# Patient Record
Sex: FEMALE | Race: WHITE | NOT HISPANIC OR LATINO | URBAN - METROPOLITAN AREA
[De-identification: names, ages, dates, MRNs, and addresses within clinical notes are randomized per-mention and may not be internally consistent; named-entity substitution may affect disease eponyms.]

---

## 2020-07-01 ENCOUNTER — TELEPHONE ENCOUNTER (OUTPATIENT)
Dept: URBAN - METROPOLITAN AREA CLINIC 128 | Facility: CLINIC | Age: 80
End: 2020-07-01

## 2020-07-02 ENCOUNTER — TELEPHONE ENCOUNTER (OUTPATIENT)
Dept: URBAN - METROPOLITAN AREA CLINIC 19 | Facility: CLINIC | Age: 80
End: 2020-07-02

## 2020-07-20 ENCOUNTER — OFFICE VISIT (OUTPATIENT)
Dept: URBAN - METROPOLITAN AREA CLINIC 128 | Facility: CLINIC | Age: 80
End: 2020-07-20
Payer: MEDICARE

## 2020-07-20 DIAGNOSIS — R10.9 ABDOMINAL PAIN, UNSPECIFIED ABDOMINAL LOCATION: ICD-10-CM

## 2020-07-20 PROCEDURE — G8420 CALC BMI NORM PARAMETERS: HCPCS | Performed by: INTERNAL MEDICINE

## 2020-07-20 PROCEDURE — G8427 DOCREV CUR MEDS BY ELIG CLIN: HCPCS | Performed by: INTERNAL MEDICINE

## 2020-07-20 PROCEDURE — 1036F TOBACCO NON-USER: CPT | Performed by: INTERNAL MEDICINE

## 2020-07-20 PROCEDURE — 99214 OFFICE O/P EST MOD 30 MIN: CPT | Performed by: INTERNAL MEDICINE

## 2020-07-20 RX ORDER — LEVOTHYROXINE SODIUM 75 UG/1
TABLET ORAL
Qty: 0 | Refills: 0 | Status: ACTIVE | COMMUNITY
Start: 1900-01-01

## 2020-07-20 RX ORDER — DICYCLOMINE HYDROCHLORIDE 20 MG/1
TAKE 1 TABLET (20 MG) BY ORAL ROUTE 3 TIMES PER DAY FOR 90 DAYS TABLET ORAL
Qty: 270 | Refills: 1 | Status: DISCONTINUED | COMMUNITY
Start: 2020-04-09 | End: 2020-10-06

## 2020-07-20 RX ORDER — ROSUVASTATIN CALCIUM 5 MG
TABLET ORAL
Qty: 0 | Refills: 0 | Status: ACTIVE | COMMUNITY
Start: 1900-01-01

## 2020-07-20 RX ORDER — METOPROLOL SUCCINATE 25 MG/1
TABLET, EXTENDED RELEASE ORAL
Qty: 0 | Refills: 0 | Status: ACTIVE | COMMUNITY
Start: 1900-01-01

## 2020-07-20 RX ORDER — HYOSCYAMINE SULFATE 0.12 MG/1
1 TABLET AS NEEDED TABLET ORAL
Status: ACTIVE | COMMUNITY

## 2020-07-20 RX ORDER — LOSARTAN POTASSIUM 50 MG/1
TABLET ORAL
Qty: 0 | Refills: 0 | Status: ACTIVE | COMMUNITY
Start: 1900-01-01

## 2020-07-20 NOTE — HPI-TODAY'S VISIT:
Mrs. Del Valle is a 80 year old female who was last seen in GI clinic on 4/30/2020 for abdominal pain.  Today she reports having a lot of pain 7/18/2020 and 7/19/2020. She reports taking ibuprofen 600mg and reports it helped with pain severity.   She is currently on ibgard as well as align.   Prior history is summarized below: -Her last EGD and colonoscopy was on 10/31/2017 which showed hyperplastic polyps and diverticulosis.  -Her CT A/P with IV contrast was on 9/27/2018 which was negative.  -She had a CT with IV and PO contrast on 2/12/2019 that showed no acute abnormality of the small or large bowel.  -Viberzi was stopped as her stools "turned to bricks."  -The CT scan mentioned enhancing foci in the right lobe of the liver measuring 1.3 x 1.2 cm for which MRI was recommended.  -On 4/23/2019 she had a MRI liver that showed "subcentimeter lesion with signal characteristics of hepatic hemangioma within segment 6 of the right hepatic lobe and subcentimeter benign appearing cysts with segment 7 of the right hepatic lobe." -On 12/6/2019 Mrs. Del Valle reported eating gluten- and lactose-free diet and it seems to be helping her with her IBS. -ON 4/9/2020 we prescribed dicyclomine and she reports remembering her in the past it made her "head feel funny."  -She reports starting align on 4/25/2020.

## 2020-07-23 ENCOUNTER — TELEPHONE ENCOUNTER (OUTPATIENT)
Dept: URBAN - METROPOLITAN AREA CLINIC 128 | Facility: CLINIC | Age: 80
End: 2020-07-23

## 2020-08-17 ENCOUNTER — OFFICE VISIT (OUTPATIENT)
Dept: URBAN - METROPOLITAN AREA CLINIC 128 | Facility: CLINIC | Age: 80
End: 2020-08-17
Payer: MEDICARE

## 2020-08-17 DIAGNOSIS — R10.84 GENERALIZED ABDOMINAL PAIN: ICD-10-CM

## 2020-08-17 PROCEDURE — G8427 DOCREV CUR MEDS BY ELIG CLIN: HCPCS | Performed by: INTERNAL MEDICINE

## 2020-08-17 PROCEDURE — G8420 CALC BMI NORM PARAMETERS: HCPCS | Performed by: INTERNAL MEDICINE

## 2020-08-17 PROCEDURE — 99214 OFFICE O/P EST MOD 30 MIN: CPT | Performed by: INTERNAL MEDICINE

## 2020-08-17 PROCEDURE — 1036F TOBACCO NON-USER: CPT | Performed by: INTERNAL MEDICINE

## 2020-08-17 RX ORDER — ROSUVASTATIN CALCIUM 5 MG
TABLET ORAL
Qty: 0 | Refills: 0 | Status: ACTIVE | COMMUNITY
Start: 1900-01-01

## 2020-08-17 RX ORDER — HYOSCYAMINE SULFATE 0.12 MG/1
1 TABLET AS NEEDED TABLET ORAL
Status: ACTIVE | COMMUNITY

## 2020-08-17 RX ORDER — LEVOTHYROXINE SODIUM 75 UG/1
TABLET ORAL
Qty: 0 | Refills: 0 | Status: ACTIVE | COMMUNITY
Start: 1900-01-01

## 2020-08-17 RX ORDER — METOPROLOL SUCCINATE 25 MG/1
TABLET, EXTENDED RELEASE ORAL
Qty: 0 | Refills: 0 | Status: ACTIVE | COMMUNITY
Start: 1900-01-01

## 2020-08-17 RX ORDER — NORTRIPTYLINE HYDROCHLORIDE 10 MG/1
1 CAPSULE CAPSULE ORAL ONCE A DAY
Qty: 30 CAPSULE | Refills: 2 | OUTPATIENT
Start: 2020-08-17

## 2020-08-17 RX ORDER — LOSARTAN POTASSIUM 50 MG/1
TABLET ORAL
Qty: 0 | Refills: 0 | Status: ACTIVE | COMMUNITY
Start: 1900-01-01

## 2020-08-17 NOTE — HPI-TODAY'S VISIT:
Mrs. Del Valle is a 80 year old female who was last seen in GI clinic on 7/20/2020 for abdominal pain.      We obtained copy of EKG from 2/27/2020 which shows QTc of 401.   She reports continuing to have abdominal pain.   At last clinic visit we referred to [facility] but she will like to defer for now.   She is currently on ibgard as well as align.   Prior history is summarized below: -Her last EGD and colonoscopy was on 10/31/2017 which showed hyperplastic polyps and diverticulosis.  -Her CT A/P with IV contrast was on 9/27/2018 which was negative.  -She had a CT with IV and PO contrast on 2/12/2019 that showed no acute abnormality of the small or large bowel.  -Viberzi was stopped as her stools "turned to bricks."  -The CT scan mentioned enhancing foci in the right lobe of the liver measuring 1.3 x 1.2 cm for which MRI was recommended.  -On 4/23/2019 she had a MRI liver that showed "subcentimeter lesion with signal characteristics of hepatic hemangioma within segment 6 of the right hepatic lobe and subcentimeter benign appearing cysts with segment 7 of the right hepatic lobe." -On 12/6/2019 Mrs. Del Valle reported eating gluten- and lactose-free diet and it seems to be helping her with her IBS. -On 4/9/2020 we prescribed dicyclomine and she reports remembering her in the past it made her "head feel funny."  -She reports starting align on 4/25/2020. -On 7/20/2020 she reported having a lot of pain 7/18/2020 and 7/19/2020. She reports taking ibuprofen 600mg and reports it helped with pain severity.

## 2020-09-14 ENCOUNTER — OFFICE VISIT (OUTPATIENT)
Dept: URBAN - METROPOLITAN AREA CLINIC 128 | Facility: CLINIC | Age: 80
End: 2020-09-14
Payer: MEDICARE

## 2020-09-14 DIAGNOSIS — R10.84 GENERALIZED ABDOMINAL PAIN: ICD-10-CM

## 2020-09-14 DIAGNOSIS — K58.0 IRRITABLE BOWEL SYNDROME WITH DIARRHEA: ICD-10-CM

## 2020-09-14 PROBLEM — 197125005: Status: ACTIVE | Noted: 2020-09-14

## 2020-09-14 PROCEDURE — G8427 DOCREV CUR MEDS BY ELIG CLIN: HCPCS | Performed by: INTERNAL MEDICINE

## 2020-09-14 PROCEDURE — 1036F TOBACCO NON-USER: CPT | Performed by: INTERNAL MEDICINE

## 2020-09-14 PROCEDURE — G8420 CALC BMI NORM PARAMETERS: HCPCS | Performed by: INTERNAL MEDICINE

## 2020-09-14 PROCEDURE — 99214 OFFICE O/P EST MOD 30 MIN: CPT | Performed by: INTERNAL MEDICINE

## 2020-09-14 RX ORDER — ROSUVASTATIN CALCIUM 5 MG
TABLET ORAL
Qty: 0 | Refills: 0 | Status: ACTIVE | COMMUNITY
Start: 1900-01-01

## 2020-09-14 RX ORDER — NORTRIPTYLINE HYDROCHLORIDE 10 MG/1
1 CAPSULE CAPSULE ORAL ONCE A DAY
Qty: 30 CAPSULE | Refills: 2 | Status: ACTIVE | COMMUNITY
Start: 2020-08-17

## 2020-09-14 RX ORDER — NORTRIPTYLINE HYDROCHLORIDE 10 MG/1
2 CAPSULE CAPSULE ORAL ONCE A DAY
Qty: 180 CAPSULE | Refills: 3 | OUTPATIENT
Start: 2020-09-14

## 2020-09-14 RX ORDER — LEVOTHYROXINE SODIUM 75 UG/1
TABLET ORAL
Qty: 0 | Refills: 0 | Status: ACTIVE | COMMUNITY
Start: 1900-01-01

## 2020-09-14 RX ORDER — LOSARTAN POTASSIUM 50 MG/1
TABLET ORAL
Qty: 0 | Refills: 0 | Status: ACTIVE | COMMUNITY
Start: 1900-01-01

## 2020-09-14 RX ORDER — METOPROLOL SUCCINATE 25 MG/1
TABLET, EXTENDED RELEASE ORAL
Qty: 0 | Refills: 0 | Status: ACTIVE | COMMUNITY
Start: 1900-01-01

## 2020-09-14 RX ORDER — HYOSCYAMINE SULFATE 0.12 MG/1
1 TABLET AS NEEDED TABLET ORAL
Status: ACTIVE | COMMUNITY

## 2020-09-14 NOTE — HPI-TODAY'S VISIT:
Mrs. Del Valle is a 80 year old female who was last seen in GI clinic on 8/17/2020 for abdominal pain.          On 8/17/2020 we initiated nortripyline 10mg QHS. Today she reports it is helping with her symptoms and she is having more good days than bad days.  She reports after starting nortripyline her stool frequency went from 2-3 times a day to once a day.   Prior history is summarized below: -Her last EGD and colonoscopy was on 10/31/2017 which showed hyperplastic polyps and diverticulosis.  -Her CT A/P with IV contrast was on 9/27/2018 which was negative.  -She had a CT with IV and PO contrast on 2/12/2019 that showed no acute abnormality of the small or large bowel.  -Viberzi was stopped as her stools "turned to bricks."  -The CT scan mentioned enhancing foci in the right lobe of the liver measuring 1.3 x 1.2 cm for which MRI was recommended.  -On 4/23/2019 she had a MRI liver that showed "subcentimeter lesion with signal characteristics of hepatic hemangioma within segment 6 of the right hepatic lobe and subcentimeter benign appearing cysts with segment 7 of the right hepatic lobe." -On 12/6/2019 Mrs. Del Valle reported eating gluten- and lactose-free diet and it seems to be helping her with her IBS. -On 4/9/2020 we prescribed dicyclomine and she reports remembering her in the past it made her "head feel funny."  -She reports starting align on 4/25/2020. -On 7/20/2020 she reported having a lot of pain 7/18/2020 and 7/19/2020. She reports taking ibuprofen 600mg and reports it helped with pain severity. -EKG from 2/27/2020 which shows QTc of 401.  -On 8/17/2020 she was on ibgard as well as align.

## 2020-12-07 ENCOUNTER — OFFICE VISIT (OUTPATIENT)
Dept: URBAN - METROPOLITAN AREA CLINIC 128 | Facility: CLINIC | Age: 80
End: 2020-12-07
Payer: MEDICARE

## 2020-12-07 DIAGNOSIS — R10.32 LLQ ABDOMINAL PAIN: ICD-10-CM

## 2020-12-07 PROCEDURE — 74177 CT ABD & PELVIS W/CONTRAST: CPT | Performed by: INTERNAL MEDICINE

## 2020-12-07 PROCEDURE — G8484 FLU IMMUNIZE NO ADMIN: HCPCS | Performed by: INTERNAL MEDICINE

## 2020-12-07 PROCEDURE — G8427 DOCREV CUR MEDS BY ELIG CLIN: HCPCS | Performed by: INTERNAL MEDICINE

## 2020-12-07 PROCEDURE — 99214 OFFICE O/P EST MOD 30 MIN: CPT | Performed by: INTERNAL MEDICINE

## 2020-12-07 PROCEDURE — 1036F TOBACCO NON-USER: CPT | Performed by: INTERNAL MEDICINE

## 2020-12-07 RX ORDER — ROSUVASTATIN CALCIUM 5 MG
TABLET ORAL
Qty: 0 | Refills: 0 | Status: ACTIVE | COMMUNITY
Start: 1900-01-01

## 2020-12-07 RX ORDER — NORTRIPTYLINE HYDROCHLORIDE 25 MG/1
1 CAPSULE CAPSULE ORAL ONCE A DAY
Qty: 90 CAPSULE | Refills: 1
Start: 2020-09-14

## 2020-12-07 RX ORDER — NORTRIPTYLINE HYDROCHLORIDE 10 MG/1
2 CAPSULE CAPSULE ORAL ONCE A DAY
Qty: 180 CAPSULE | Refills: 3 | Status: ACTIVE | COMMUNITY
Start: 2020-09-14

## 2020-12-07 RX ORDER — NORTRIPTYLINE HYDROCHLORIDE 10 MG/1
1 CAPSULE CAPSULE ORAL ONCE A DAY
Qty: 30 CAPSULE | Refills: 2 | Status: ACTIVE | COMMUNITY
Start: 2020-08-17

## 2020-12-07 RX ORDER — LOSARTAN POTASSIUM 50 MG/1
TABLET ORAL
Qty: 0 | Refills: 0 | Status: ACTIVE | COMMUNITY
Start: 1900-01-01

## 2020-12-07 RX ORDER — HYOSCYAMINE SULFATE 0.12 MG/1
1 TABLET AS NEEDED TABLET ORAL
Status: ACTIVE | COMMUNITY

## 2020-12-07 RX ORDER — METOPROLOL SUCCINATE 25 MG/1
TABLET, EXTENDED RELEASE ORAL
Qty: 0 | Refills: 0 | Status: ACTIVE | COMMUNITY
Start: 1900-01-01

## 2020-12-07 RX ORDER — HYOSCYAMINE SULFATE 0.12 MG/1
1 TABLET AS NEEDED TABLET ORAL
Qty: 120 TABLET | Refills: 3

## 2020-12-07 RX ORDER — LEVOTHYROXINE SODIUM 75 UG/1
TABLET ORAL
Qty: 0 | Refills: 0 | Status: ACTIVE | COMMUNITY
Start: 1900-01-01

## 2020-12-07 NOTE — HPI-TODAY'S VISIT:
Mrs. Del Valle is a 80 year old female who was last seen in GI clinic on 9/14/2020 for abdominal pain.          She reports she was doing well after her last clinic visit but a couple of weeks ago starting having more LLQ abdominal pain. She reports being under a lot of stress with the death of her spouse of 56 years.   She reports no fevers/chills. She reports her pain was more severe this weekend and was concernced about diveticulitis.   Prior history is summarized below: -Her last EGD and colonoscopy was on 10/31/2017 which showed hyperplastic polyps and diverticulosis.  -Her CT A/P with IV contrast was on 9/27/2018 which was negative.  -She had a CT with IV and PO contrast on 2/12/2019 that showed no acute abnormality of the small or large bowel.  -Viberzi was stopped as her stools "turned to bricks."  -The CT scan mentioned enhancing foci in the right lobe of the liver measuring 1.3 x 1.2 cm for which MRI was recommended.  -On 4/23/2019 she had a MRI liver that showed "subcentimeter lesion with signal characteristics of hepatic hemangioma within segment 6 of the right hepatic lobe and subcentimeter benign appearing cysts with segment 7 of the right hepatic lobe." -On 12/6/2019 Mrs. Del Valle reported eating gluten- and lactose-free diet and it seems to be helping her with her IBS. -On 4/9/2020 we prescribed dicyclomine and she reports remembering her in the past it made her "head feel funny."  -She reports starting align on 4/25/2020. -On 7/20/2020 she reported having a lot of pain 7/18/2020 and 7/19/2020. She reports taking ibuprofen 600mg and reports it helped with pain severity. -EKG from 2/27/2020 which shows QTc of 401.  -On 8/17/2020 she was on ibgard as well as align. -On 8/17/2020 we initiated nortripyline 10mg QHS. On 9/14/2020 she reported it is helping with her symptoms and she is having more good days than bad days. She reports after starting nortripyline her stool frequency went from 2-3 times a day to once a day.

## 2020-12-11 ENCOUNTER — LAB OUTSIDE AN ENCOUNTER (OUTPATIENT)
Dept: URBAN - METROPOLITAN AREA CLINIC 19 | Facility: CLINIC | Age: 80
End: 2020-12-11

## 2020-12-11 LAB
CREATININE POC: 1
PERFORMING LAB: (no result)

## 2021-01-20 ENCOUNTER — TELEPHONE ENCOUNTER (OUTPATIENT)
Dept: URBAN - METROPOLITAN AREA CLINIC 19 | Facility: CLINIC | Age: 81
End: 2021-01-20

## 2021-01-20 ENCOUNTER — TELEPHONE ENCOUNTER (OUTPATIENT)
Dept: URBAN - METROPOLITAN AREA CLINIC 96 | Facility: CLINIC | Age: 81
End: 2021-01-20

## 2021-01-21 ENCOUNTER — OFFICE VISIT (OUTPATIENT)
Dept: URBAN - METROPOLITAN AREA CLINIC 19 | Facility: CLINIC | Age: 81
End: 2021-01-21
Payer: MEDICARE

## 2021-01-21 ENCOUNTER — WEB ENCOUNTER (OUTPATIENT)
Dept: URBAN - METROPOLITAN AREA CLINIC 19 | Facility: CLINIC | Age: 81
End: 2021-01-21

## 2021-01-21 DIAGNOSIS — R63.4 UNINTENTIONAL WEIGHT LOSS: ICD-10-CM

## 2021-01-21 DIAGNOSIS — R10.32 LLQ ABDOMINAL PAIN: ICD-10-CM

## 2021-01-21 PROCEDURE — 1036F TOBACCO NON-USER: CPT | Performed by: INTERNAL MEDICINE

## 2021-01-21 PROCEDURE — G8427 DOCREV CUR MEDS BY ELIG CLIN: HCPCS | Performed by: INTERNAL MEDICINE

## 2021-01-21 PROCEDURE — 99214 OFFICE O/P EST MOD 30 MIN: CPT | Performed by: INTERNAL MEDICINE

## 2021-01-21 PROCEDURE — G8482 FLU IMMUNIZE ORDER/ADMIN: HCPCS | Performed by: INTERNAL MEDICINE

## 2021-01-21 RX ORDER — NORTRIPTYLINE HYDROCHLORIDE 25 MG/1
1 CAPSULE CAPSULE ORAL ONCE A DAY
Qty: 90 CAPSULE | Refills: 1 | Status: ACTIVE | COMMUNITY
Start: 2020-09-14

## 2021-01-21 RX ORDER — ROSUVASTATIN CALCIUM 5 MG
TABLET ORAL
Qty: 0 | Refills: 0 | Status: ACTIVE | COMMUNITY
Start: 1900-01-01

## 2021-01-21 RX ORDER — LOSARTAN POTASSIUM 50 MG/1
TABLET ORAL
Qty: 0 | Refills: 0 | Status: ACTIVE | COMMUNITY
Start: 1900-01-01

## 2021-01-21 RX ORDER — NORTRIPTYLINE HYDROCHLORIDE 10 MG/1
1 CAPSULE CAPSULE ORAL ONCE A DAY
Qty: 30 CAPSULE | Refills: 2 | Status: ACTIVE | COMMUNITY
Start: 2020-08-17

## 2021-01-21 RX ORDER — METOPROLOL SUCCINATE 25 MG/1
TABLET, EXTENDED RELEASE ORAL
Qty: 0 | Refills: 0 | Status: ACTIVE | COMMUNITY
Start: 1900-01-01

## 2021-01-21 RX ORDER — LEVOTHYROXINE SODIUM 75 UG/1
TABLET ORAL
Qty: 0 | Refills: 0 | Status: ACTIVE | COMMUNITY
Start: 1900-01-01

## 2021-01-21 RX ORDER — HYOSCYAMINE SULFATE 0.12 MG/1
1 TABLET AS NEEDED TABLET ORAL
Qty: 120 TABLET | Refills: 3 | Status: ACTIVE | COMMUNITY

## 2021-01-21 NOTE — HPI-TODAY'S VISIT:
Mrs. Del Valle is a 80 year old female who was last seen in GI clinic who was last seen in GI clinic on 12/7/2020 for abdominal pain.           At time of last clinic visit we ordered a CT A/P to further evaluate LLQ abdominal pain. The CT A/P showed "no acute abnormality in the abdomen or pelvis. It did show gastroesophageal reflu and fecal loading suspicious for constipation."   At time of last clinic visit we increased nortripyline from 20mg to 25mg. She reports having issues with dry mouth which caused her to need to stop the medication.   She reports being started on zoloft.   She reports having LLQ abdominal pain which she reports is different from her symptoms in 2017. She reports being very concerned with her pain and weight loss and is inquiring regarding performing a colonoscopy.   Prior history is summarized below: -Her EGD and colonoscopy was on 10/31/2017 which showed hyperplastic polyps and diverticulosis.  -Her CT A/P with IV contrast was on 9/27/2018 which was negative.  -She had a CT with IV and PO contrast on 2/12/2019 that showed no acute abnormality of the small or large bowel.  -Viberzi was stopped as her stools "turned to bricks."  -The CT scan mentioned enhancing foci in the right lobe of the liver measuring 1.3 x 1.2 cm for which MRI was recommended.  -On 4/23/2019 she had a MRI liver that showed "subcentimeter lesion with signal characteristics of hepatic hemangioma within segment 6 of the right hepatic lobe and subcentimeter benign appearing cysts with segment 7 of the right hepatic lobe." -On 12/6/2019 Mrs. Del Valle reported eating gluten- and lactose-free diet and it seems to be helping her with her IBS. -On 4/9/2020 we prescribed dicyclomine and she reports remembering her in the past it made her "head feel funny."  -She reports starting align on 4/25/2020. -On 7/20/2020 she reported having a lot of pain 7/18/2020 and 7/19/2020. She reports taking ibuprofen 600mg and reports it helped with pain severity. -EKG from 2/27/2020 which shows QTc of 401.  -On 8/17/2020 she was on ibgard as well as align. -On 8/17/2020 we initiated nortripyline 10mg QHS. On 9/14/2020 she reported it is helping with her symptoms and she is having more good days than bad days. She reports after starting nortripyline her stool frequency went from 2-3 times a day to once a day. -On 12/7/2020 she reported she was doing well after her last clinic visit but a couple of weeks ago starting having more LLQ abdominal pain. She reports being under a lot of stress with the death of her spouse of 56 years.

## 2021-02-15 ENCOUNTER — CLAIMS CREATED FROM THE CLAIM WINDOW (OUTPATIENT)
Dept: URBAN - METROPOLITAN AREA CLINIC 4 | Facility: CLINIC | Age: 81
End: 2021-02-15
Payer: MEDICARE

## 2021-02-15 ENCOUNTER — OFFICE VISIT (OUTPATIENT)
Dept: URBAN - METROPOLITAN AREA SURGERY CENTER 31 | Facility: SURGERY CENTER | Age: 81
End: 2021-02-15
Payer: MEDICARE

## 2021-02-15 ENCOUNTER — OFFICE VISIT (OUTPATIENT)
Dept: URBAN - METROPOLITAN AREA CLINIC 128 | Facility: CLINIC | Age: 81
End: 2021-02-15

## 2021-02-15 DIAGNOSIS — K63.89 OTHER SPECIFIED DISEASES OF INTESTINE: ICD-10-CM

## 2021-02-15 DIAGNOSIS — R10.32 ABDOMINAL CRAMPING IN LEFT LOWER QUADRANT: ICD-10-CM

## 2021-02-15 PROCEDURE — 45380 COLONOSCOPY AND BIOPSY: CPT | Performed by: INTERNAL MEDICINE

## 2021-02-15 PROCEDURE — 88342 IMHCHEM/IMCYTCHM 1ST ANTB: CPT | Performed by: PATHOLOGY

## 2021-02-15 PROCEDURE — 88305 TISSUE EXAM BY PATHOLOGIST: CPT | Performed by: PATHOLOGY

## 2021-02-15 PROCEDURE — G8907 PT DOC NO EVENTS ON DISCHARG: HCPCS | Performed by: INTERNAL MEDICINE

## 2021-02-15 PROCEDURE — 88313 SPECIAL STAINS GROUP 2: CPT | Performed by: PATHOLOGY

## 2021-02-15 RX ORDER — NORTRIPTYLINE HYDROCHLORIDE 25 MG/1
1 CAPSULE CAPSULE ORAL ONCE A DAY
Qty: 90 CAPSULE | Refills: 1 | Status: ACTIVE | COMMUNITY
Start: 2020-09-14

## 2021-02-15 RX ORDER — HYOSCYAMINE SULFATE 0.12 MG/1
1 TABLET AS NEEDED TABLET ORAL
Qty: 120 TABLET | Refills: 3 | Status: ACTIVE | COMMUNITY

## 2021-02-15 RX ORDER — LEVOTHYROXINE SODIUM 75 UG/1
TABLET ORAL
Qty: 0 | Refills: 0 | Status: ACTIVE | COMMUNITY
Start: 1900-01-01

## 2021-02-15 RX ORDER — ROSUVASTATIN CALCIUM 5 MG
TABLET ORAL
Qty: 0 | Refills: 0 | Status: ACTIVE | COMMUNITY
Start: 1900-01-01

## 2021-02-15 RX ORDER — NORTRIPTYLINE HYDROCHLORIDE 10 MG/1
1 CAPSULE CAPSULE ORAL ONCE A DAY
Qty: 30 CAPSULE | Refills: 2 | Status: ACTIVE | COMMUNITY
Start: 2020-08-17

## 2021-02-15 RX ORDER — METOPROLOL SUCCINATE 25 MG/1
TABLET, EXTENDED RELEASE ORAL
Qty: 0 | Refills: 0 | Status: ACTIVE | COMMUNITY
Start: 1900-01-01

## 2021-02-15 RX ORDER — LOSARTAN POTASSIUM 50 MG/1
TABLET ORAL
Qty: 0 | Refills: 0 | Status: ACTIVE | COMMUNITY
Start: 1900-01-01

## 2021-02-22 ENCOUNTER — TELEPHONE ENCOUNTER (OUTPATIENT)
Dept: URBAN - METROPOLITAN AREA CLINIC 19 | Facility: CLINIC | Age: 81
End: 2021-02-22

## 2021-04-12 ENCOUNTER — OFFICE VISIT (OUTPATIENT)
Dept: URBAN - METROPOLITAN AREA CLINIC 128 | Facility: CLINIC | Age: 81
End: 2021-04-12
Payer: MEDICARE

## 2021-04-12 DIAGNOSIS — R10.32 LLQ ABDOMINAL PAIN: ICD-10-CM

## 2021-04-12 PROCEDURE — 99213 OFFICE O/P EST LOW 20 MIN: CPT | Performed by: INTERNAL MEDICINE

## 2021-04-12 RX ORDER — HYOSCYAMINE SULFATE 0.12 MG/1
1 TABLET AS NEEDED TABLET ORAL
Qty: 120 TABLET | Refills: 3 | Status: ACTIVE | COMMUNITY

## 2021-04-12 RX ORDER — SERTRALINE 25 MG/1
1.5 TABLET TABLET, FILM COATED ORAL ONCE A DAY
Status: ACTIVE | COMMUNITY

## 2021-04-12 RX ORDER — LEVOTHYROXINE SODIUM 75 UG/1
TABLET ORAL
Qty: 0 | Refills: 0 | Status: ACTIVE | COMMUNITY
Start: 1900-01-01

## 2021-04-12 RX ORDER — ROSUVASTATIN CALCIUM 5 MG
TABLET ORAL
Qty: 0 | Refills: 0 | Status: ACTIVE | COMMUNITY
Start: 1900-01-01

## 2021-04-12 RX ORDER — LOSARTAN POTASSIUM 50 MG/1
TABLET ORAL
Qty: 0 | Refills: 0 | Status: ACTIVE | COMMUNITY
Start: 1900-01-01

## 2021-04-12 RX ORDER — NORTRIPTYLINE HYDROCHLORIDE 10 MG/1
1 CAPSULE CAPSULE ORAL ONCE A DAY
Qty: 30 CAPSULE | Refills: 2 | Status: DISCONTINUED | COMMUNITY
Start: 2020-08-17

## 2021-04-12 RX ORDER — NORTRIPTYLINE HYDROCHLORIDE 25 MG/1
1 CAPSULE CAPSULE ORAL ONCE A DAY
Qty: 90 CAPSULE | Refills: 1 | Status: DISCONTINUED | COMMUNITY
Start: 2020-09-14

## 2021-04-12 RX ORDER — METOPROLOL SUCCINATE 25 MG/1
TABLET, EXTENDED RELEASE ORAL
Qty: 0 | Refills: 0 | Status: ACTIVE | COMMUNITY
Start: 1900-01-01

## 2021-04-12 NOTE — HPI-TODAY'S VISIT:
Mrs. Del Valle is a 80 year old female who was last seen in GI clinic who was last seen in GI clinic on 1/21/2021 for abdominal pain.                 On 2/15/2021 she had a colonoscopy that showed sigmoid colon diverticulosis and medium sized internal hemorrhoids. Pathology was negative for microscopic colitis.   Today she reports having days with no problems and other days where she has LLQ abdominal pain/cramping. She reports hyoscyamine helps. She reports gaining weight; her weight was on 109.8 lbs on 1/21/2021 and today it was 112.8 lbs.   She reports typically having a BM daily and occassionally twice a day.   She reports currently being on sertraline which is prescribed by her PCP.   Prior history is summarized below: -Her EGD and colonoscopy was on 10/31/2017 which showed hyperplastic polyps and diverticulosis.  -Her CT A/P with IV contrast was on 9/27/2018 which was negative.  -She had a CT with IV and PO contrast on 2/12/2019 that showed no acute abnormality of the small or large bowel.   -Viberzi was stopped as her stools "turned to bricks."  -The CT scan mentioned enhancing foci in the right lobe of the liver measuring 1.3 x 1.2 cm for which MRI was recommended.  -On 4/23/2019 she had a MRI liver that showed "subcentimeter lesion with signal characteristics of hepatic hemangioma within segment 6 of the right hepatic lobe and subcentimeter benign appearing cysts with segment 7 of the right hepatic lobe." -On 12/6/2019 Mrs. Del Valle reported eating gluten- and lactose-free diet and it seems to be helping her with her IBS. She reports low FODMAP diet is helping with symptoms.  -On 4/9/2020 we prescribed dicyclomine and she reports remembering her in the past it made her "head feel funny."  -She reports starting align on 4/25/2020. -On 7/20/2020 she reported having a lot of pain 7/18/2020 and 7/19/2020. She reports taking ibuprofen 600mg and reports it helped with pain severity. -EKG from 2/27/2020 which shows QTc of 401.  -On 8/17/2020 she was on ibgard as well as align. -On 8/17/2020 we initiated nortripyline 10mg QHS. On 9/14/2020 she reported it is helping with her symptoms and she is having more good days than bad days.  -On 12/11/2020 CT A/P showed "no acute abnormality in the abdomen or pelvis. It did show gastroesophageal reflux and fecal loading suspicious for constipation."  -On 12/7/2020 we increased nortripyline from 20mg to 25mg. She reports having issues with dry mouth on nortripyline which caused her to need to stop the medication.

## 2022-03-02 ENCOUNTER — TELEPHONE ENCOUNTER (OUTPATIENT)
Dept: URBAN - METROPOLITAN AREA CLINIC 128 | Facility: CLINIC | Age: 82
End: 2022-03-02

## 2022-03-02 RX ORDER — HYOSCYAMINE SULFATE 0.12 MG/1
1 TABLET AS NEEDED TABLET ORAL
Qty: 90 | Refills: 3

## 2022-03-23 ENCOUNTER — OFFICE VISIT (OUTPATIENT)
Dept: URBAN - METROPOLITAN AREA CLINIC 128 | Facility: CLINIC | Age: 82
End: 2022-03-23
Payer: MEDICARE

## 2022-03-23 DIAGNOSIS — R10.32 LLQ ABDOMINAL PAIN: ICD-10-CM

## 2022-03-23 PROCEDURE — 99213 OFFICE O/P EST LOW 20 MIN: CPT | Performed by: PHYSICIAN ASSISTANT

## 2022-03-23 RX ORDER — METRONIDAZOLE 500 MG/1
1 TABLET TABLET, FILM COATED ORAL THREE TIMES A DAY
Qty: 30 TABLET | Refills: 0 | OUTPATIENT
Start: 2022-03-23 | End: 2022-04-02

## 2022-03-23 RX ORDER — SERTRALINE 25 MG/1
1.5 TABLET TABLET, FILM COATED ORAL ONCE A DAY
Status: ACTIVE | COMMUNITY

## 2022-03-23 RX ORDER — LOSARTAN POTASSIUM 100 MG/1
1 TABLET TABLET ORAL ONCE A DAY
Refills: 0 | Status: ACTIVE | COMMUNITY
Start: 1900-01-01

## 2022-03-23 RX ORDER — LEVOTHYROXINE SODIUM 75 UG/1
TABLET ORAL
Qty: 0 | Refills: 0 | Status: ACTIVE | COMMUNITY
Start: 1900-01-01

## 2022-03-23 RX ORDER — HYOSCYAMINE SULFATE 0.12 MG/1
1 TABLET AS NEEDED TABLET ORAL
Qty: 90 | Refills: 3 | Status: ACTIVE | COMMUNITY

## 2022-03-23 RX ORDER — ROSUVASTATIN CALCIUM 5 MG
TABLET ORAL
Qty: 0 | Refills: 0 | Status: ACTIVE | COMMUNITY
Start: 1900-01-01

## 2022-03-23 RX ORDER — METOPROLOL SUCCINATE 25 MG/1
TABLET, EXTENDED RELEASE ORAL
Qty: 0 | Refills: 0 | Status: ACTIVE | COMMUNITY
Start: 1900-01-01

## 2022-03-23 RX ORDER — CIPROFLOXACIN 500 MG/1
1 TABLET TABLET, FILM COATED ORAL
Qty: 20 TABLET | Refills: 0 | OUTPATIENT
Start: 2022-03-23 | End: 2022-04-02

## 2022-03-23 NOTE — HPI-TODAY'S VISIT:
Mrs. Del Valle is a 80 year old female who was last seen in GI clinic who was last seen in GI clinic on 1/21/2021 for abdominal pain.                 On 2/15/2021 she had a colonoscopy that showed sigmoid colon diverticulosis and medium sized internal hemorrhoids. Pathology was negative for microscopic colitis.   Today she reports having days with no problems and other days where she has LLQ abdominal pain/cramping. She states the hyoscyamine makes her cramps worse. She reports gaining weight.  She reports typically having a formed non-bloody BM daily and occassionally twice a day.   She reports currently being on sertraline which is prescribed by her PCP.   Prior history is summarized below: -Her EGD and colonoscopy was on 10/31/2017 which showed hyperplastic polyps and diverticulosis.  -Her CT A/P with IV contrast was on 9/27/2018 which was negative.  -She had a CT with IV and PO contrast on 2/12/2019 that showed no acute abnormality of the small or large bowel.   -Viberzi was stopped as her stools "turned to bricks."  -The CT scan mentioned enhancing foci in the right lobe of the liver measuring 1.3 x 1.2 cm for which MRI was recommended.  -On 4/23/2019 she had a MRI liver that showed "subcentimeter lesion with signal characteristics of hepatic hemangioma within segment 6 of the right hepatic lobe and subcentimeter benign appearing cysts with segment 7 of the right hepatic lobe." -On 12/6/2019 Mrs. Del Valle reported eating gluten- and lactose-free diet and it seems to be helping her with her IBS. She reports low FODMAP diet is helping with symptoms.  -On 4/9/2020 we prescribed dicyclomine and she reports remembering her in the past it made her "head feel funny."  -On 7/20/2020 she reported having a lot of pain 7/18/2020 and 7/19/2020. She reports taking ibuprofen 600mg and reports it helped with pain severity. -On 8/17/2020 she was on ibgard as well as align. -On 8/17/2020 we initiated nortripyline 10mg QHS. On 9/14/2020 she reported it is helping with her symptoms and she is having more good days than bad days.  -On 12/11/2020 CT A/P showed "no acute abnormality in the abdomen or pelvis. It did show gastroesophageal reflux and fecal loading suspicious for constipation."  -On 12/7/2020 we increased nortripyline from 20mg to 25mg. She reports having issues with dry mouth on nortripyline which caused her to need to stop the medication.  Currently, the patient elicits having LLQ abdominal x several weeks. She has had a history of diverticulitis. She has been told she has IBS. She has 2 Bms a day on average.  Her last colonosocpy was 02/20201. She denies fever, nausea, vomiting, diarrhea. She has been avoiding gluten and dairy in her diet.

## 2022-03-23 NOTE — PHYSICAL EXAM HENT:
Head,  normocephalic,  atraumatic,  Face,  Face within normal limits,  Ears,  External ears within normal limits,  Nose/Nasopharynx,  External nose  normal appearance, no nasal discharge,  Mouth and Throat,  Oral cavity appearance normal Lips,  Appearance normal ,[email or web address]

## 2022-03-23 NOTE — PHYSICAL EXAM GASTROINTESTINAL
Abdomen , soft, mild tenderness to palpation in the LLQ was noted, nondistended , no guarding or rigidity , no masses palpable , normal bowel sounds, negative psoas and obturators signs, negative CVA tenderness bilaterally Liver and Spleen , no hepatosplenomegaly Rectal deferred

## 2022-03-25 LAB
A/G RATIO: 2.2
ALBUMIN: 4.8
ALKALINE PHOSPHATASE: 85
ALT (SGPT): 14
AST (SGOT): 21
BASO (ABSOLUTE): 0
BASOS: 1
BILIRUBIN, TOTAL: 0.7
BUN/CREATININE RATIO: 24
BUN: 18
CALCIUM: 10.3
CARBON DIOXIDE, TOTAL: 23
CHLORIDE: 100
CREATININE: 0.74
DEAMIDATED GLIADIN ABS, IGA: 3
DEAMIDATED GLIADIN ABS, IGG: 4
EGFR: 82
ENDOMYSIAL ANTIBODY IGA: NEGATIVE
EOS (ABSOLUTE): 0.1
EOS: 1
GLOBULIN, TOTAL: 2.2
GLUCOSE: 91
HEMATOCRIT: 46.1
HEMATOLOGY COMMENTS:: (no result)
HEMOGLOBIN: 14.7
IMMATURE CELLS: (no result)
IMMATURE GRANS (ABS): 0
IMMATURE GRANULOCYTES: 0
IMMUNOGLOBULIN A, QN, SERUM: 84
LYMPHS (ABSOLUTE): 2
LYMPHS: 37
MCH: 30.1
MCHC: 31.9
MCV: 94
MONOCYTES(ABSOLUTE): 0.3
MONOCYTES: 5
NEUTROPHILS (ABSOLUTE): 3
NEUTROPHILS: 56
NRBC: (no result)
PLATELETS: 192
POTASSIUM: 3.9
PROTEIN, TOTAL: 7
RBC: 4.89
RDW: 12.6
SODIUM: 139
T-TRANSGLUTAMINASE (TTG) IGA: <2
T-TRANSGLUTAMINASE (TTG) IGG: 22
WBC: 5.3

## 2022-04-04 ENCOUNTER — LAB OUTSIDE AN ENCOUNTER (OUTPATIENT)
Dept: URBAN - METROPOLITAN AREA CLINIC 19 | Facility: CLINIC | Age: 82
End: 2022-04-04

## 2022-04-04 LAB
CREATININE POC: 0.8
PERFORMING LAB: (no result)

## 2022-04-20 ENCOUNTER — OFFICE VISIT (OUTPATIENT)
Dept: URBAN - METROPOLITAN AREA CLINIC 128 | Facility: CLINIC | Age: 82
End: 2022-04-20
Payer: MEDICARE

## 2022-04-20 ENCOUNTER — DASHBOARD ENCOUNTERS (OUTPATIENT)
Age: 82
End: 2022-04-20

## 2022-04-20 DIAGNOSIS — R89.4 ABNORMAL CELIAC ANTIBODY PANEL: ICD-10-CM

## 2022-04-20 DIAGNOSIS — R10.32 LLQ ABDOMINAL PAIN: ICD-10-CM

## 2022-04-20 PROBLEM — 166166006: Status: ACTIVE | Noted: 2022-04-20

## 2022-04-20 PROCEDURE — 99213 OFFICE O/P EST LOW 20 MIN: CPT | Performed by: PHYSICIAN ASSISTANT

## 2022-04-20 RX ORDER — OLMESARTAN MEDOXOMIL AND HYDROCHLOROTHIAZIDE 40/25 40; 25 MG/1; MG/1
1 TABLET TABLET ORAL ONCE A DAY
Status: ACTIVE | COMMUNITY

## 2022-04-20 RX ORDER — HYOSCYAMINE SULFATE 0.12 MG/1
1 TABLET AS NEEDED TABLET ORAL
Qty: 90 | Refills: 3 | Status: ACTIVE | COMMUNITY

## 2022-04-20 RX ORDER — METOPROLOL SUCCINATE 25 MG/1
TABLET, EXTENDED RELEASE ORAL
Qty: 0 | Refills: 0 | Status: ACTIVE | COMMUNITY
Start: 1900-01-01

## 2022-04-20 RX ORDER — ROSUVASTATIN CALCIUM 5 MG
TABLET ORAL
Qty: 0 | Refills: 0 | Status: ACTIVE | COMMUNITY
Start: 1900-01-01

## 2022-04-20 RX ORDER — LEVOTHYROXINE SODIUM 75 UG/1
TABLET ORAL
Qty: 0 | Refills: 0 | Status: ACTIVE | COMMUNITY
Start: 1900-01-01

## 2022-04-20 RX ORDER — SERTRALINE 25 MG/1
1.5 TABLET TABLET, FILM COATED ORAL ONCE A DAY
Status: ACTIVE | COMMUNITY

## 2022-04-20 RX ORDER — LOSARTAN POTASSIUM 100 MG/1
1 TABLET TABLET ORAL ONCE A DAY
Refills: 0 | Status: ACTIVE | COMMUNITY
Start: 1900-01-01

## 2022-04-20 NOTE — HPI-TODAY'S VISIT:
Mrs. Del Valle is a 80 year old female who was last seen in GI clinic who was last seen in GI clinic on 1/21/2021 for abdominal pain.    [facility] last visit, she states her abdominal pain has been more managable. She eats lactose free and has been eating gluten free for 1.5 years. Her celiac panel was abnormal. Her abdominal and pelvic CT scan revealed no acute abnormalities and a stable left renal cyst wa snoted.              On 2/15/2021 she had a colonoscopy that showed sigmoid colon diverticulosis and medium sized internal hemorrhoids. Pathology was negative for microscopic colitis.   Today she reports having days with no problems and other days where she has LLQ abdominal pain/cramping. She states the hyoscyamine makes her cramps worse. She reports gaining weight.  She reports typically having a formed non-bloody BM daily and occassionally twice a day.   She reports currently being on sertraline which is prescribed by her PCP.   Prior history is summarized below: -Her EGD and colonoscopy was on 10/31/2017 which showed hyperplastic polyps and diverticulosis.  -Her CT A/P with IV contrast was on 9/27/2018 which was negative.  -She had a CT with IV and PO contrast on 2/12/2019 that showed no acute abnormality of the small or large bowel.   -Viberzi was stopped as her stools "turned to bricks."  -The CT scan mentioned enhancing foci in the right lobe of the liver measuring 1.3 x 1.2 cm for which MRI was recommended.  -On 4/23/2019 she had a MRI liver that showed "subcentimeter lesion with signal characteristics of hepatic hemangioma within segment 6 of the right hepatic lobe and subcentimeter benign appearing cysts with segment 7 of the right hepatic lobe." -On 12/6/2019 Mrs. Del Valle reported eating gluten- and lactose-free diet and it seems to be helping her with her IBS. She reports low FODMAP diet is helping with symptoms.  -On 4/9/2020 we prescribed dicyclomine and she reports remembering her in the past it made her "head feel funny."  -On 7/20/2020 she reported having a lot of pain 7/18/2020 and 7/19/2020. She reports taking ibuprofen 600mg and reports it helped with pain severity. -On 8/17/2020 she was on ibgard as well as align. -On 8/17/2020 we initiated nortripyline 10mg QHS. On 9/14/2020 she reported it is helping with her symptoms and she is having more good days than bad days.  -On 12/11/2020 CT A/P showed "no acute abnormality in the abdomen or pelvis. It did show gastroesophageal reflux and fecal loading suspicious for constipation."  -On 12/7/2020 we increased nortripyline from 20mg to 25mg. She reports having issues with dry mouth on nortripyline which caused her to need to stop the medication.  Her last colonosocpy was 02/20201. She denies fever, nausea, vomiting, diarrhea. She has been avoiding gluten and dairy in her diet.

## 2022-04-20 NOTE — PHYSICAL EXAM GASTROINTESTINAL
Abdomen , soft, non-tender, nondistended , no guarding or rigidity , no masses palpable , normal bowel sounds, negative psoas and obturators signs, negative CVA tenderness bilaterally Liver and Spleen , no hepatosplenomegaly Rectal deferred

## 2024-06-13 ENCOUNTER — APPOINTMENT (RX ONLY)
Age: 84
Setting detail: DERMATOLOGY
End: 2024-06-13

## 2024-06-13 DIAGNOSIS — L72.0 EPIDERMAL CYST: ICD-10-CM

## 2024-06-13 DIAGNOSIS — D18.0 HEMANGIOMA: ICD-10-CM

## 2024-06-13 DIAGNOSIS — L81.0 POSTINFLAMMATORY HYPERPIGMENTATION: ICD-10-CM

## 2024-06-13 DIAGNOSIS — D22 MELANOCYTIC NEVI: ICD-10-CM

## 2024-06-13 DIAGNOSIS — L82.1 OTHER SEBORRHEIC KERATOSIS: ICD-10-CM

## 2024-06-13 PROBLEM — D18.01 HEMANGIOMA OF SKIN AND SUBCUTANEOUS TISSUE: Status: ACTIVE | Noted: 2024-06-13

## 2024-06-13 PROBLEM — D22.39 MELANOCYTIC NEVI OF OTHER PARTS OF FACE: Status: ACTIVE | Noted: 2024-06-13

## 2024-06-13 PROCEDURE — ? COUNSELING

## 2024-06-13 PROCEDURE — 99203 OFFICE O/P NEW LOW 30 MIN: CPT | Mod: 25

## 2024-06-13 PROCEDURE — ? ACNE SURGERY

## 2024-06-13 PROCEDURE — 10040 EXTRACTION: CPT | Mod: NC

## 2024-06-13 ASSESSMENT — LOCATION ZONE DERM
LOCATION ZONE: TRUNK
LOCATION ZONE: FACE

## 2024-06-13 ASSESSMENT — LOCATION SIMPLE DESCRIPTION DERM
LOCATION SIMPLE: LEFT CHEEK
LOCATION SIMPLE: LEFT FOREHEAD
LOCATION SIMPLE: UPPER BACK
LOCATION SIMPLE: LEFT EYEBROW

## 2024-06-13 ASSESSMENT — LOCATION DETAILED DESCRIPTION DERM
LOCATION DETAILED: LEFT LATERAL MALAR CHEEK
LOCATION DETAILED: SUPERIOR THORACIC SPINE
LOCATION DETAILED: LEFT FOREHEAD
LOCATION DETAILED: LEFT MEDIAL EYEBROW
LOCATION DETAILED: INFERIOR THORACIC SPINE

## 2024-06-13 NOTE — HPI: EVALUATION OF SKIN LESION(S)
Hpi Title: Evaluation of a Skin Lesion
Have Your Spot(S) Been Treated In The Past?: has not been treated
No

## 2024-06-13 NOTE — PROCEDURE: ACNE SURGERY
Consent was obtained and risks were reviewed including but not limited to scarring, infection, bleeding, scabbing, incomplete removal, and allergy to anesthesia.
Acne Type: Milial cysts
Extraction Method: 11 blade and comedo extractor
Prep Text (Optional): Prior to removal the treatment areas were prepped in the usual fashion.
Render Number Of Lesions Treated: no
Post-Care Instructions: I reviewed with the patient in detail post-care instructions. Patient is to keep the treatment areas dry overnight, and then apply bacitracin twice daily until healed. Patient may apply hydrogen peroxide soaks to remove any crusting.
Detail Level: Detailed